# Patient Record
Sex: MALE | Race: BLACK OR AFRICAN AMERICAN | NOT HISPANIC OR LATINO | Employment: FULL TIME | URBAN - METROPOLITAN AREA
[De-identification: names, ages, dates, MRNs, and addresses within clinical notes are randomized per-mention and may not be internally consistent; named-entity substitution may affect disease eponyms.]

---

## 2017-08-10 ENCOUNTER — HOSPITAL ENCOUNTER (EMERGENCY)
Facility: HOSPITAL | Age: 27
Discharge: HOME | End: 2017-08-10
Attending: EMERGENCY MEDICINE
Payer: COMMERCIAL

## 2017-08-10 VITALS
TEMPERATURE: 99 F | DIASTOLIC BLOOD PRESSURE: 82 MMHG | HEIGHT: 69 IN | RESPIRATION RATE: 18 BRPM | OXYGEN SATURATION: 100 % | HEART RATE: 100 BPM | SYSTOLIC BLOOD PRESSURE: 131 MMHG | WEIGHT: 240 LBS | BODY MASS INDEX: 35.55 KG/M2

## 2017-08-10 DIAGNOSIS — J02.9 SORE THROAT: ICD-10-CM

## 2017-08-10 DIAGNOSIS — J02.9 PHARYNGITIS, UNSPECIFIED ETIOLOGY: ICD-10-CM

## 2017-08-10 DIAGNOSIS — R50.9 ACUTE FEBRILE ILLNESS: Primary | ICD-10-CM

## 2017-08-10 LAB — DEPRECATED S PYO AG THROAT QL EIA: NEGATIVE

## 2017-08-10 PROCEDURE — 87081 CULTURE SCREEN ONLY: CPT

## 2017-08-10 PROCEDURE — 25000003 PHARM REV CODE 250: Performed by: NURSE PRACTITIONER

## 2017-08-10 PROCEDURE — 99283 EMERGENCY DEPT VISIT LOW MDM: CPT

## 2017-08-10 PROCEDURE — 63600175 PHARM REV CODE 636 W HCPCS: Performed by: NURSE PRACTITIONER

## 2017-08-10 PROCEDURE — 87880 STREP A ASSAY W/OPTIC: CPT

## 2017-08-10 PROCEDURE — 63700000 PHARM REV CODE 250 ALT 637 W/O HCPCS: Performed by: NURSE PRACTITIONER

## 2017-08-10 RX ORDER — AMOXICILLIN 500 MG/1
500 CAPSULE ORAL EVERY 12 HOURS
Qty: 20 CAPSULE | Refills: 0 | Status: SHIPPED | OUTPATIENT
Start: 2017-08-10 | End: 2017-08-20

## 2017-08-10 RX ORDER — ACETAMINOPHEN 500 MG
500 TABLET ORAL
Status: COMPLETED | OUTPATIENT
Start: 2017-08-10 | End: 2017-08-10

## 2017-08-10 RX ORDER — AMOXICILLIN 250 MG/1
500 CAPSULE ORAL
Status: COMPLETED | OUTPATIENT
Start: 2017-08-10 | End: 2017-08-10

## 2017-08-10 RX ADMIN — ACETAMINOPHEN 500 MG: 500 TABLET ORAL at 12:08

## 2017-08-10 RX ADMIN — AMOXICILLIN 500 MG: 250 CAPSULE ORAL at 12:08

## 2017-08-10 RX ADMIN — DEXAMETHASONE 10 MG: 2 TABLET ORAL at 12:08

## 2017-08-10 NOTE — ED TRIAGE NOTES
Pt reports having fever since Sunday and having sore throat since Monday.  Pt OTC meds without any relief.    Pt rates pain as 9.  Pt reports chills and difficutly swallowing and Lt ear pain.

## 2017-08-10 NOTE — DISCHARGE INSTRUCTIONS
Please return to the Emergency Department for any new or worsening symptoms including: worsening pain, difficulty swallowing, not eating or drinking, fever, chest pain, shortness of breath, loss of consciousness, dizziness, weakness, or any other concerns.     Please follow up with your Primary Care Provider within in the week. If you do not have one, you may contact the one listed on your discharge paperwork or you may also call the [facility] Appointment Desk at [phone] to schedule an appointment with one.     Please take all medication as prescribed. Tylenol or ibuprofen as needed for fevers or body aches.

## 2017-08-10 NOTE — ED PROVIDER NOTES
Encounter Date: 8/10/2017    SCRIBE #1 NOTE: I, Rissa Salgado, am scribing for, and in the presence of, Maciej Woodard NP. Other sections scribed: HPI/ROS.       History     Chief Complaint   Patient presents with    Fever     States he has been running a fever and a sore throat since sunday night    Sore Throat     CC: Sore throat    Pt is a 27 y.o. male w/ no significant PMHx presenting to the ED c/o sore throat and fever since Sunday (8/6/17). Pt reports taking OTC medications with no relief. Pt states he did not check his temperature but has had chills and felt hot. Pt took ibuprofen 2 hours PTA. Pt denies cough, history of throat infections, sick contacts, abdominal pain, or dysuria. Pt reports no further symptoms. No alleviating or aggravating factors.         The history is provided by the patient.     Review of patient's allergies indicates:  No Known Allergies  History reviewed. No pertinent past medical history.  History reviewed. No pertinent surgical history.  History reviewed. No pertinent family history.  Social History   Substance Use Topics    Smoking status: Current Every Day Smoker     Types: Cigars    Smokeless tobacco: Never Used    Alcohol use No     Review of Systems   Constitutional: Positive for chills and fever.   HENT: Positive for sore throat. Negative for postnasal drip, tinnitus, trouble swallowing and voice change.    Eyes: Negative for visual disturbance.   Respiratory: Negative for cough, choking and shortness of breath.    Cardiovascular: Negative for chest pain.   Gastrointestinal: Negative for abdominal pain, diarrhea, nausea and vomiting.   Musculoskeletal: Negative for arthralgias and myalgias.   Skin: Negative for rash and wound.   Neurological: Negative for syncope and headaches.   Psychiatric/Behavioral: Negative for confusion.       Physical Exam     Initial Vitals [08/10/17 1217]   BP Pulse Resp Temp SpO2   131/82 100 18 98.9 °F (37.2 °C) 100 %      MAP       98.33          Physical Exam    Nursing note and vitals reviewed.  Constitutional: Vital signs are normal. He appears well-developed and well-nourished. He is not diaphoretic. He is cooperative.  Non-toxic appearance. He does not have a sickly appearance. No distress.   HENT:   Head: Normocephalic and atraumatic.   Right Ear: Tympanic membrane and external ear normal.   Left Ear: Tympanic membrane and external ear normal.   Nose: Nose normal.   Mouth/Throat: Uvula is midline. No trismus in the jaw. Oropharyngeal exudate, posterior oropharyngeal edema and posterior oropharyngeal erythema present. No tonsillar abscesses.   Posterior oropharyngeal erythema with +2 tonsils with exudates bilaterally.  Midline uvula.  No evidence PTA.    Eyes: Conjunctivae and EOM are normal.   Neck: Full passive range of motion without pain and phonation normal. No spinous process tenderness and no muscular tenderness present. No neck rigidity.   Cardiovascular: Normal rate and regular rhythm.   Pulses:       Radial pulses are 2+ on the right side, and 2+ on the left side.   Rate on physical exam 88 bpm apical pulse   Pulmonary/Chest: Breath sounds normal. No tachypnea and no bradypnea. No respiratory distress. He has no wheezes. He has no rales.   Musculoskeletal: Normal range of motion.   Lymphadenopathy:     He has cervical adenopathy.        Right cervical: Superficial cervical adenopathy present.        Left cervical: Superficial cervical adenopathy present.   Neurological: He is alert and oriented to person, place, and time. He has normal strength. No sensory deficit. Coordination and gait normal. GCS eye subscore is 4. GCS verbal subscore is 5. GCS motor subscore is 6.   Skin: Skin is warm and dry. Capillary refill takes less than 2 seconds. No rash noted. No erythema.   Psychiatric: He has a normal mood and affect. His behavior is normal. Judgment and thought content normal.         ED Course   Procedures  Labs Reviewed   THROAT SCREEN,  RAPID   CULTURE, STREP A,  THROAT                   APC / Resident Notes:   This is an evaluation of a 27 y.o. male that presents to the Emergency Department for sore throat and fever.  The patient is a non-toxic, afebrile, and well appearing male. On physical exam, there is tonsillar exudates, erythema, with +2 tonsils; he has no trismus, uvula deviation, drooling, stridor, or respiratory distress. No sign of PTA. There is cervical lymphadenopathy. Neck is soft and supple with no meningeal signs. Breath sounds clear and equal bilaterally. Vital Signs Are Reassuring.  Rapid Strep Test: Negative for given the patient's physical exam and findings, I'll treat empirically for strep pharyngitis.  He was offered Bicillin IM and Decadron IM.  He declined both shots.  I will give him a course of oral Decadron and will be treated with oral antibiotics per his request.    Given the above findings, my overall impression is acute febrile illness, pharyngitis. Given the above findings, I do not think the patient has OM, OE, peritonsillar abscess, retropharyngeal abscess, epiglotitis, meningitis, or airway compromise.    ED Course: Amoxicillin and Decadron. The patient will be discharged home with amoxicillin. Home care: OTC medications for symptomatic relief, sore throat self care DC instructions. The diagnosis, treatment plan, instructions for follow-up and reevaluation with Primary Care as well as ED return precautions have been discussed with the patient and understanding of the information was verbalized. All questions or concerns from the patient have been addressed. This case was discussed with Dr. Jolley who is in agreement with my assessment and plan. PRABHU Casas, FNP-C        Scribe Attestation:   Scribe #1: I performed the above scribed service and the documentation accurately describes the services I performed. I attest to the accuracy of the note.    Attending Attestation:           Physician Attestation for  Scribe:  Physician Attestation Statement for Scribe #1: I, Maciej Woodard, NP, reviewed documentation, as scribed by Rissa Salgado in my presence, and it is both accurate and complete.                 ED Course     Clinical Impression:   The primary encounter diagnosis was Acute febrile illness. Diagnoses of Sore throat and Pharyngitis, unspecified etiology were also pertinent to this visit.    Disposition:   Disposition: Discharged  Condition: Stable                        Maciej Woodard, LOYDA  08/10/17 1501

## 2017-08-12 LAB — BACTERIA THROAT CULT: NORMAL
